# Patient Record
Sex: FEMALE | Race: WHITE | NOT HISPANIC OR LATINO | Employment: FULL TIME | URBAN - METROPOLITAN AREA
[De-identification: names, ages, dates, MRNs, and addresses within clinical notes are randomized per-mention and may not be internally consistent; named-entity substitution may affect disease eponyms.]

---

## 2018-02-07 ENCOUNTER — OFFICE VISIT (OUTPATIENT)
Dept: URGENT CARE | Facility: CLINIC | Age: 73
End: 2018-02-07
Payer: MEDICARE

## 2018-02-07 VITALS
DIASTOLIC BLOOD PRESSURE: 72 MMHG | OXYGEN SATURATION: 96 % | TEMPERATURE: 98 F | HEIGHT: 66 IN | WEIGHT: 169 LBS | RESPIRATION RATE: 18 BRPM | BODY MASS INDEX: 27.16 KG/M2 | HEART RATE: 62 BPM | SYSTOLIC BLOOD PRESSURE: 135 MMHG

## 2018-02-07 DIAGNOSIS — S90.851A SPLINTER OF RIGHT FOOT, INITIAL ENCOUNTER: Primary | ICD-10-CM

## 2018-02-07 PROCEDURE — 3008F BODY MASS INDEX DOCD: CPT | Mod: S$GLB,,, | Performed by: EMERGENCY MEDICINE

## 2018-02-07 PROCEDURE — 1159F MED LIST DOCD IN RCRD: CPT | Mod: S$GLB,,, | Performed by: EMERGENCY MEDICINE

## 2018-02-07 PROCEDURE — 99203 OFFICE O/P NEW LOW 30 MIN: CPT | Mod: S$GLB,,, | Performed by: EMERGENCY MEDICINE

## 2018-02-07 NOTE — PROGRESS NOTES
Subjective:       Patient ID: Verna Hines is a 73 y.o. female.    Vitals:    02/07/18 1614   BP: 135/72   Pulse: 62   Resp: 18   Temp: 98 °F (36.7 °C)       Chief Complaint: Foot Pain    Pt states that she feels like something is sticking her in the bottom right lateral foot just below little toe.  She has felt this in her foot for approx a day and a half.  Denies walking around barefoot. STATES  GOT SOMETHING BLACK AND SILVER OUT AND THEN SHE USED AN [employer] BOARD TO FILE IT DOWN BUT THAT WAS NOT SUCCESSFUL. NO BLEEDING, NO PUS DRAINAGE, HOWEVER A SHARP AND MILDLY PAINFUL STEP WHEN SHE WALKS. OBVIOUS SPLINTER PRESENT UPON INSPECTION ALONG THE PLANTAR ASPECT OF THE FOOT AT THE DISTAL 5TH METATARSAL.      Foot Pain   This is a new problem. The current episode started in the past 7 days. The problem has been unchanged. Pertinent negatives include no chills, fever, rash or sore throat. The symptoms are aggravated by walking.     Review of Systems   Constitution: Negative for chills and fever.   HENT: Negative for sore throat.    Respiratory: Negative for shortness of breath.    Skin: Negative for itching and rash.   Musculoskeletal: Negative for joint pain.       Objective:      Physical Exam   Constitutional: She is oriented to person, place, and time. She appears well-developed and well-nourished.   Cardiovascular: Normal rate and regular rhythm.    Pulmonary/Chest: Effort normal and breath sounds normal.   Abdominal: Soft. Bowel sounds are normal.   Musculoskeletal: Normal range of motion. She exhibits tenderness (MILD TTP OF THE PLANTAR ASPECT OF THE FOOT BY THE DISTAL 5TH MT,1-2 MM BLACK SPLINTER NOTED UNDER CALLOUS). She exhibits no edema.   SEE SKIN  TTP AT THE SITE OF LACERATION   Neurological: She is alert and oriented to person, place, and time.   DISTAL TO WOUND NV INTACT   Skin: Skin is warm and dry.   WOUND/LACERATION/ABSCESS AS FOLLOWS:  DISTAL SOLE, 1-2 MM SILVER/BLACK SPLINTER, UNDER  CALLOUS, EASILY REMOVED WITH CORING METHOID WITH 18 GAGUE NEEDLE     Nursing note and vitals reviewed.      Assessment:       1. Splinter of right foot, initial encounter        Plan:       Verna was seen today for foot pain.    Diagnoses and all orders for this visit:    Splinter of right foot, initial encounter          Patient Instructions     Foreign Object Under the Skin (Removed)  An object has been removed from under your skin. Although care was taken to remove all of it, there is always a chance that a small piece may have been left behind.  Most skin wounds heal without problems. But there can be an increased risk of infection if anything stays under the skin. Sometimes the pieces work their way out on their own, and sometimes they can cause an infection. Very small pieces that stay under the skin usually dont cause a problem or need further treatment.  Home care  Wound care  · Keep the wound clean and dry.  · If there is a dressing or bandage, change it when it gets wet or dirty. Otherwise, leave it on for the first 24 hours, then change it once a day or as often as you were instructed.  · If stitches or staples were used, clean the wound every day:  ¨ After taking off the dressing, wash the area gently with soap and water.  ¨ Apply a thin layer of antibiotic ointment to the cut. This will keep the wound clean and make it easier to remove the stitches. If it is oozing a lot, you can put a nonstick dressing over it. Then reapply the bandage or dressing as you were instructed.  ¨ You can get it wet, just like when you clean it. This means you can shower as usual for the first 24 hours, but do not soak the area in water (no baths or swimming) until the stitches or staples are taken out.  · If surgical tape or strips were used, keep the area clean and dry. If it becomes wet, blot it dry with a towel.    Medicine  · You can take over-the-counter medicine for pain, unless you were given a different pain  medicine to use. If you have chronic liver or kidney disease or ever had a stomach ulcer, or gastrointestinal bleeding or are taking blood thinner medicines, talk with your healthcare provider before using these medicines.  · If you were given antibiotics, take them until they are used up. It is important to finish the antibiotics even if the wound looks better to make sure the infection clears.  Follow-up care  Follow up your healthcare provider, or as advised. Keep in mind the following:  · Watch for any signs of infection, such as increasing pain, redness, swelling, or pus drainage. If this happens, dont wait for your scheduled visit, rather see your healthcare provider sooner.  · Stitches or staples are usually taken out within 5 to 14 days. This varies depending on what part of your body they are on, and the type of wound. The healthcare provider will tell you how long they should be left in.  · If surgical tape or strips were used, they are usually left on for 7 to 10 days. You can remove them after that unless you were told otherwise. If you try to remove them, and it is too difficult, soaking can help. If the edges of the cut pull apart, then stop removing the tape, and follow up with your healthcare provider.  · If X-rays were taken, you will be told if there are new findings that may affect your care.  When to seek medical care  Call your healthcare provider right away if any of these occur:  · Fever of 100.4ºF (38ºC) or higher, or as directed by your healthcare provider  · Increasing pain in the wound  · Redness, swelling or pus coming from the wound  Date Last Reviewed: 10/1/2016  © 8783-7110 [employer]. [address] [place] [address], [place] [address]. All rights reserved. This information is not intended as a substitute for professional medical care. Always follow your healthcare professional's instructions.        Splinter Removal  A splinter has been removed from under your skin. Although  care was taken to remove all pieces present, there is a chance that a small piece may have been left behind.  Very small particles that remain under the skin usually cause no problem and need no further treatment unless an infection develops.  You may receive a tetanus shot if needed. You may be given antibiotics to prevent or treat infection based on many factors. Some of these factors include location, severity of injury, time since injury, and other concerns.  Home care  The following guidelines will help you care for your wound at home:  · Keep the injured part elevated during the first 2 days to reduce swelling and pain.  · Keep the wound clean and dry. If a bandage was applied and it becomes wet or dirty, replace it. Otherwise, leave it in place for the first 24 hours. Then, clean the wound daily:  ¨ After removing the bandage, wash the area with soap and water. Use a wet cotton swab to loosen and remove any blood or crust that forms.  ¨ After cleaning, apply a thin layer of antibiotic ointment. Put on a fresh bandage.  · Unless told otherwise, you may shower as usual, but do not soak the area in water for at least 1 week. This means no baths or swimming.  · You may use over-the-counter medication for pain, unless another pain medicine was given. Talk with your doctor before using acetaminophen or ibuprofen if you have chronic liver or kidney disease. Also talk with your doctor if you have ever had a stomach ulcer or GI bleeding.  Follow-up care  Follow up with your healthcare provider, or as advised. Most skin wounds heal within 10 days. But there is a risk of infection if there are any particles that are still under the skin. Therefore, check the wound in 2 days for the signs of infection listed below. Any stitches should be removed within 7 to 14 days. If surgical tape closures were used, remove them yourself if they have not fallen off after 7 days.  When to seek medical advice  Call your healthcare  provider right away if any of these occur:  · Increasing pain in the wound  · Redness, swelling, or pus coming from the wound  · Fever of 100.4ºF (38ºC) or higher, or as directed by your healthcare provider  Date Last Reviewed: 10/1/2016  © 6388-5547 [employer]. [address] [place] [address], [place] [address]. All rights reserved. This information is not intended as a substitute for professional medical care. Always follow your healthcare professional's instructions.      bactroban ointment 3 timer per day  Ok to clean as normal  Return for any concerns or problems  Splinter removed

## 2018-03-05 ENCOUNTER — OFFICE VISIT (OUTPATIENT)
Dept: URGENT CARE | Facility: CLINIC | Age: 73
End: 2018-03-05
Payer: MEDICARE

## 2018-03-05 VITALS
WEIGHT: 169 LBS | SYSTOLIC BLOOD PRESSURE: 147 MMHG | RESPIRATION RATE: 16 BRPM | BODY MASS INDEX: 27.16 KG/M2 | OXYGEN SATURATION: 95 % | HEIGHT: 66 IN | DIASTOLIC BLOOD PRESSURE: 67 MMHG | HEART RATE: 54 BPM | TEMPERATURE: 98 F

## 2018-03-05 DIAGNOSIS — M67.471 GANGLION CYST OF RIGHT FOOT: ICD-10-CM

## 2018-03-05 DIAGNOSIS — B07.0 PLANTAR WART, RIGHT FOOT: Primary | ICD-10-CM

## 2018-03-05 PROCEDURE — 10060 I&D ABSCESS SIMPLE/SINGLE: CPT | Mod: S$GLB,,, | Performed by: EMERGENCY MEDICINE

## 2018-03-05 PROCEDURE — 3078F DIAST BP <80 MM HG: CPT | Mod: S$GLB,,, | Performed by: EMERGENCY MEDICINE

## 2018-03-05 PROCEDURE — 99214 OFFICE O/P EST MOD 30 MIN: CPT | Mod: 25,S$GLB,, | Performed by: EMERGENCY MEDICINE

## 2018-03-05 PROCEDURE — 3077F SYST BP >= 140 MM HG: CPT | Mod: S$GLB,,, | Performed by: EMERGENCY MEDICINE

## 2018-03-05 RX ORDER — MUPIROCIN 20 MG/G
OINTMENT TOPICAL
Qty: 22 G | Refills: 1 | Status: SHIPPED | OUTPATIENT
Start: 2018-03-05 | End: 2021-10-12

## 2018-03-05 RX ORDER — SULFAMETHOXAZOLE AND TRIMETHOPRIM 800; 160 MG/1; MG/1
1 TABLET ORAL 2 TIMES DAILY
Qty: 20 TABLET | Refills: 0 | Status: SHIPPED | OUTPATIENT
Start: 2018-03-05 | End: 2018-03-15

## 2018-03-06 NOTE — PATIENT INSTRUCTIONS
Wash with antibacterial soap and water.  Expect mild/moderate pain and mild bleeding with dressing changes.  Stay ahead of pain with tylenol and motrin.  Bactroban ointment at least 2 times daily.  Bactrim DS Rx  Follow up with Podiatry    Return for any concerns or problems  Keep clean and covered.  Ganglion Cyst: Foot  A ganglion is a fluid-filled swelling of the lining of a joint or tendon. Ganglions can form on any part of the foot. But they most often appear on the ankle or top of the foot. Ganglions tend to change in size and often grow slowly.  Causes  Repeated irritation can weaken the lining of a joint or tendon. This can lead to ganglions. People who wear boots are more likely to get ganglions. This type of footwear puts stress on the foot and ankle. Bone spurs (bony outgrowths) may also cause ganglions by irritating the joints and tendons.  Symptoms  Ganglions often form with no symptoms. But the ganglion may put pressure on the nerves and the overlying skin. This can cause tingling, numbness, or pain. Ganglions sometimes swell. Their size can change with different activities or a change in weather.  How are they diagnosed?  Ganglions are sometimes mistaken for tumors. So its important to have a complete exam done. Tests may be done to confirm the diagnosis.  Medical history  Your healthcare provider will ask you questions. These include how long youve had the ganglion and what kind of symptoms youre feeling. He or she may ask if its changed in size or if its size varies based on your activities.  Physical exam  Your healthcare provider may do a translumination exam. This involves shining a light through the swelling. (Usually, you can see through a ganglion, but not through a tumor.) When your foot is palpated (pressed), a ganglion feels spongy and the fluid moves from side to side.  Tests  If a bone spur is suspected, X-rays may be needed. Fluid removal (needle aspiration) may be done. It also helps  to decrease pain. To confirm a ganglion, magnetic resonance imaging (MRI) may be done. This reveals images of soft tissue and bone. Sometimes, special dyes may be injected into the area to show the outline.  How are ganglions treated?  Ganglions may be hard to treat without surgery. But nonsurgical methods may be helpful in relieving some of your symptoms.  Nonsurgical care  · Pads placed around the ganglion can ease pressure and friction.  · Fluid removal may also relieve symptoms. This is done with a needle. A steroid may be injected at the same time. But ganglions may recur.  · Limiting movements or activities that increase pain may bring relief.  · Icing the ganglion for 15 to 20 minutes may relieve inflammation and pain for a short time.  · If inflammation is severe, your healthcare provider may treat your symptoms with medicine.  Surgical treatment  Surgery may be needed if a ganglion is causing ongoing or severe pain. The entire ganglion wall is removed during the procedure. Some nearby tissue may also be removed.  After surgery  You may feel pain, swelling, numbness, or tingling for several weeks following surgery. You should be able to walk soon afterward. But your foot may need to be wrapped or in a cast, boot, or hard shoe. Be sure to see your healthcare provider if you notice any future problems. Surgery is usually successful. But there is a chance that the ganglion will recur.  Date Last Reviewed: 11/14/2015 © 2000-2017 [employer]. [address] [place] [address]. All rights reserved. This information is not intended as a substitute for professional medical care. Always follow your healthcare professional's instructions.        Understanding Plantar Warts    A plantar wart is a small noncancerous growth on the bottom of the foot. Plantar warts often develop where friction or pressure occurs, such as on the ball of the foot. The word plantar refers to the sole of the foot.  Similar warts can occur on other areas of the body such as the hands. Plantar warts are more common in children and young adults.  What causes a plantar wart?  Plantar warts are caused by a virus called human papillomavirus (HPV).  They can be spread by person-to-person contact. Or you can develop one if you walk barefoot on moist surfaces infected with the virus, such as in a community pool area or locker rooms. Wearing proper footwear in such places can prevent them.  What are the symptoms of plantar warts?  Plantar warts cause a thick patch of skin on the bottom of the foot. The wart may have black dots on it. These dots are dried blood. The wart may cause pain or discomfort. You may also have trouble walking because of the pain.  How are plantar warts treated?  Many plantar warts go away without any treatment. But for those that are painful or that dont go away, several treatments are available. These include:  · Salicylic acid. This treatment is applied directly to the wart. It may come in the form of a liquid, ointment, pad, or patch. It is available over the counter.  · Cryotherapy.  Your healthcare provider puts liquid nitrogen on the wart with a cotton swab. This treatment can be painful.  · Duct tape. One study shows a benefit by putting duct tape on the wart for 6 days. You then soak the wart and scrape it with an emery board. This is repeated until the wart is gone or for 2 months. Other studies show this does not work well to remove the wart.  · Medicine. A variety of medicines can be put on or injected into the wart. But research is mixed on how well they work.  Often your healthcare provider will cut away dead parts of the wart before also using one of these other treatments.    When should I call my healthcare provider?  Call your healthcare provider if you have plantar warts that become too painful and do not go away on their own or with over-the-counter and at home treatments.   Date Last  Reviewed: 3/30/2016  © 3422-5515 [employer]. [address] [place] [address], [place] [address]. All rights reserved. This information is not intended as a substitute for professional medical care. Always follow your healthcare professional's instructions.

## 2018-03-06 NOTE — PROGRESS NOTES
"Subjective:       Patient ID: Verna Hines is a 73 y.o. female.    Vitals:    03/05/18 1756   BP: (!) 147/67   Pulse: (!) 54   Resp: 16   Temp: 97.6 °F (36.4 °C)       Chief Complaint: Foot Pain    Pt was here on 2/7/18 for splinter removal from right foot. Pt states it feels like there is "still something in there". Pt states pain at puncture site.  HERE FOR RETURN OF PAIN AS WELL AS FIRM AND HARD AREA AT THE PRESSURE SITE OF THE5TH MT HEAD. SHE REPORTS DISCOLORATION AND FEELS IT IS FOREIGN BODY AND REQUESTING TO RETRIEVE IT AS IT IS CAUSING SEVERE PAIN. EXPLAINED THAT I WOULD CORE OUT AREA TO RULE OUT PUS AS WELL AS FOREIGN BODY, ALTHOUGH POSSIBLY DDX INCLUDES GANAGLION CYST UNDERNEATH, PLANTAR WART, FOREIGN BODY, CALLOUS FORMATION WITH CELLULITIS.      Foot Pain   This is a chronic problem. The current episode started 1 to 4 weeks ago. The problem occurs constantly. The problem has been gradually worsening. Pertinent negatives include no abdominal pain, chest pain, chills, fever, headaches, nausea, rash, sore throat or vomiting. The symptoms are aggravated by standing. She has tried nothing for the symptoms.     Review of Systems   Constitution: Negative for chills and fever.   HENT: Negative for sore throat.    Eyes: Negative for blurred vision.   Cardiovascular: Negative for chest pain.   Respiratory: Negative for shortness of breath.    Skin: Negative for rash.   Musculoskeletal: Negative for back pain and joint pain.   Gastrointestinal: Negative for abdominal pain, diarrhea, nausea and vomiting.   Neurological: Negative for headaches.   Psychiatric/Behavioral: The patient is not nervous/anxious.        Objective:      Physical Exam   Constitutional: She is oriented to person, place, and time. She appears well-developed and well-nourished.   HENT:   Head: Normocephalic and atraumatic. Head is without abrasion, without contusion and without laceration.   Eyes: Conjunctivae, EOM and lids are normal. " "Pupils are equal, round, and reactive to light.   Neck: Trachea normal, full passive range of motion without pain and phonation normal.   Cardiovascular: Normal rate, regular rhythm and normal heart sounds.    Pulmonary/Chest: Effort normal and breath sounds normal. No respiratory distress.   Musculoskeletal: Normal range of motion. She exhibits edema and tenderness.   MILD ERYTHEMA AND TTP OF THE RIGHT DISTAL MTP JOINT AREA, VOLAR ASPECT WITH 2 X 2 CM AREA OF CALLOUS AND FIRM AREA WHICH IS VERY TTP. DOES HAVE 1 MM AREA OF DISCOLORATION, POSSIBLY RETAINED SCAB, RETAINED FOREIGN BODY, DISCOLORATION PLANTAR WART. MILD ERYTHEMA, CELLULITIS SURROUNDING. POSSIBLE MOBILE CYST UNDERNEATH.. PLAN DISCUSSED TO CORE OUT AND CLEAN DEBRIDE AREA AS REQUESTED.   Neurological: She is alert and oriented to person, place, and time.   Skin: Skin is warm, dry and intact. Capillary refill takes less than 2 seconds. No abrasion, no bruising, no burn, no ecchymosis, no laceration, no lesion and no rash noted. There is erythema.   Psychiatric: She has a normal mood and affect. Her speech is normal and behavior is normal. Cognition and memory are normal.   Nursing note and vitals reviewed.        Incision & Drainage  Date/Time: 3/5/2018 7:32 PM  Performed by: FRANCISCO KIM  Authorized by: FRANCISCO KIM     Time out: Immediately prior to procedure a "time out" was called to verify the correct patient, procedure, equipment, support staff and site/side marked as required.    Consent Done?:  Yes (Verbal)    Type:  Abscess  Body area:  Lower extremity  Location details:  Right foot  Anesthesia:  Local infiltration  Local anesthetic:  Lidocaine 2% without epinephrinelidocaine 2% without epinephrine  Anesthetic total (ml):  5  Scalpel size:  11  Incision type:  Single straight  Complexity:  Simple  Drainage:  Pus  Drainage amount:  Scant  Wound treatment:  Incision and drainage  Patient tolerance:  Patient tolerated the procedure well " with no immediate complications    WOUND ANESTHETISED WITH LIDOCAINE   GOOD ANESTHESIAS ACHIEVED AND CORED OUT AREA WITH DISCOLORATION AND DOES NOT APPERAR TO BE FOREIGN BODY. APPEARS TO BE CALLOUS SKIN WITH LIKELY PLANTART WART. COINCIDENTALLY A TINY AMOUNT OF PUS EXPRESSED AND THEN PINK/GELATINOUS FLUID EXPRESSED FOLLOWED BY A WHITE FLACCID SAC THAT WAS REMOVED. BY INSPECTION AND LOCATION THIS WAS A GANGLION CYST AT THE SAME AREA ALONG THE FLEXORS OF THE FOOT SPECIFICALLY THE 5TH TOE. AREA CLEANED AND COVERED WITH BACTROBAN OINTMENT AND GAUZE AND THE ACE WRAPPED. PLACED ON ORAL ABX AND BACTROBAN OINTMENT. REFERRED TO PODIATRY AND ENCOURAGED TO RETURN FOR ANY PROBLEMS. DECLINED PAIN MEDS,.        Assessment:       1. Plantar wart, right foot    2. Ganglion cyst of right foot        Plan:       Verna was seen today for foot pain.    Diagnoses and all orders for this visit:    Plantar wart, right foot  -     Ambulatory referral to Podiatry    Ganglion cyst of right foot  -     Ambulatory referral to Podiatry    Other orders  -     mupirocin (BACTROBAN) 2 % ointment; Apply to affected area 3 times daily  -     sulfamethoxazole-trimethoprim 800-160mg (BACTRIM DS) 800-160 mg Tab; Take 1 tablet by mouth 2 (two) times daily.  -     INCISION AND DRAINAGE          Patient Instructions     Wash with antibacterial soap and water.  Expect mild/moderate pain and mild bleeding with dressing changes.  Stay ahead of pain with tylenol and motrin.  Bactroban ointment at least 2 times daily.  Bactrim DS Rx  Follow up with Podiatry    Return for any concerns or problems  Keep clean and covered.  Ganglion Cyst: Foot  A ganglion is a fluid-filled swelling of the lining of a joint or tendon. Ganglions can form on any part of the foot. But they most often appear on the ankle or top of the foot. Ganglions tend to change in size and often grow slowly.  Causes  Repeated irritation can weaken the lining of a joint or tendon. This can lead to  ganglions. People who wear boots are more likely to get ganglions. This type of footwear puts stress on the foot and ankle. Bone spurs (bony outgrowths) may also cause ganglions by irritating the joints and tendons.  Symptoms  Ganglions often form with no symptoms. But the ganglion may put pressure on the nerves and the overlying skin. This can cause tingling, numbness, or pain. Ganglions sometimes swell. Their size can change with different activities or a change in weather.  How are they diagnosed?  Ganglions are sometimes mistaken for tumors. So its important to have a complete exam done. Tests may be done to confirm the diagnosis.  Medical history  Your healthcare provider will ask you questions. These include how long youve had the ganglion and what kind of symptoms youre feeling. He or she may ask if its changed in size or if its size varies based on your activities.  Physical exam  Your healthcare provider may do a translumination exam. This involves shining a light through the swelling. (Usually, you can see through a ganglion, but not through a tumor.) When your foot is palpated (pressed), a ganglion feels spongy and the fluid moves from side to side.  Tests  If a bone spur is suspected, X-rays may be needed. Fluid removal (needle aspiration) may be done. It also helps to decrease pain. To confirm a ganglion, magnetic resonance imaging (MRI) may be done. This reveals images of soft tissue and bone. Sometimes, special dyes may be injected into the area to show the outline.  How are ganglions treated?  Ganglions may be hard to treat without surgery. But nonsurgical methods may be helpful in relieving some of your symptoms.  Nonsurgical care  · Pads placed around the ganglion can ease pressure and friction.  · Fluid removal may also relieve symptoms. This is done with a needle. A steroid may be injected at the same time. But ganglions may recur.  · Limiting movements or activities that increase pain may  bring relief.  · Icing the ganglion for 15 to 20 minutes may relieve inflammation and pain for a short time.  · If inflammation is severe, your healthcare provider may treat your symptoms with medicine.  Surgical treatment  Surgery may be needed if a ganglion is causing ongoing or severe pain. The entire ganglion wall is removed during the procedure. Some nearby tissue may also be removed.  After surgery  You may feel pain, swelling, numbness, or tingling for several weeks following surgery. You should be able to walk soon afterward. But your foot may need to be wrapped or in a cast, boot, or hard shoe. Be sure to see your healthcare provider if you notice any future problems. Surgery is usually successful. But there is a chance that the ganglion will recur.  Date Last Reviewed: 11/14/2015 © 2000-2017 [employer]. [address] [place] [address], [place] [address]. All rights reserved. This information is not intended as a substitute for professional medical care. Always follow your healthcare professional's instructions.        Understanding Plantar Warts    A plantar wart is a small noncancerous growth on the bottom of the foot. Plantar warts often develop where friction or pressure occurs, such as on the ball of the foot. The word plantar refers to the sole of the foot. Similar warts can occur on other areas of the body such as the hands. Plantar warts are more common in children and young adults.  What causes a plantar wart?  Plantar warts are caused by a virus called human papillomavirus (HPV).  They can be spread by person-to-person contact. Or you can develop one if you walk barefoot on moist surfaces infected with the virus, such as in a community pool area or locker rooms. Wearing proper footwear in such places can prevent them.  What are the symptoms of plantar warts?  Plantar warts cause a thick patch of skin on the bottom of the foot. The wart may have black dots on it. These dots are dried blood.  The wart may cause pain or discomfort. You may also have trouble walking because of the pain.  How are plantar warts treated?  Many plantar warts go away without any treatment. But for those that are painful or that dont go away, several treatments are available. These include:  · Salicylic acid. This treatment is applied directly to the wart. It may come in the form of a liquid, ointment, pad, or patch. It is available over the counter.  · Cryotherapy.  Your healthcare provider puts liquid nitrogen on the wart with a cotton swab. This treatment can be painful.  · Duct tape. One study shows a benefit by putting duct tape on the wart for 6 days. You then soak the wart and scrape it with an emery board. This is repeated until the wart is gone or for 2 months. Other studies show this does not work well to remove the wart.  · Medicine. A variety of medicines can be put on or injected into the wart. But research is mixed on how well they work.  Often your healthcare provider will cut away dead parts of the wart before also using one of these other treatments.    When should I call my healthcare provider?  Call your healthcare provider if you have plantar warts that become too painful and do not go away on their own or with over-the-counter and at home treatments.   Date Last Reviewed: 3/30/2016  © 4579-8018 [employer]. [address] [place] [address]. All rights reserved. This information is not intended as a substitute for professional medical care. Always follow your healthcare professional's instructions.        03/07/2018  Mrs Hines called as she could not tolerate her Bactrim DS rx.  In speaking with her she said the foot is doing better with only scant discharge that was 'more red'.  She has had no fever either.  In light of this information, I recommended that we suspend oral antibiotics as Dr. Stuart's procedure most likely evacuated the source of the infection as well as the infection.   She is in agreement and will observe for now.

## 2018-09-21 ENCOUNTER — OFFICE VISIT (OUTPATIENT)
Dept: ENDOCRINOLOGY | Facility: CLINIC | Age: 73
End: 2018-09-21
Payer: MEDICARE

## 2018-09-21 VITALS
DIASTOLIC BLOOD PRESSURE: 74 MMHG | SYSTOLIC BLOOD PRESSURE: 122 MMHG | WEIGHT: 173.75 LBS | BODY MASS INDEX: 27.92 KG/M2 | RESPIRATION RATE: 16 BRPM | HEIGHT: 66 IN

## 2018-09-21 DIAGNOSIS — E55.9 VITAMIN D DEFICIENCY: ICD-10-CM

## 2018-09-21 DIAGNOSIS — M85.80 OSTEOPENIA, UNSPECIFIED LOCATION: ICD-10-CM

## 2018-09-21 DIAGNOSIS — E06.3 HASHIMOTO'S THYROIDITIS: Primary | ICD-10-CM

## 2018-09-21 DIAGNOSIS — L50.8 URTICARIA, CHRONIC: Chronic | ICD-10-CM

## 2018-09-21 PROCEDURE — 3074F SYST BP LT 130 MM HG: CPT | Mod: CPTII,,, | Performed by: INTERNAL MEDICINE

## 2018-09-21 PROCEDURE — 99204 OFFICE O/P NEW MOD 45 MIN: CPT | Mod: S$PBB,,, | Performed by: INTERNAL MEDICINE

## 2018-09-21 PROCEDURE — 99213 OFFICE O/P EST LOW 20 MIN: CPT | Mod: PBBFAC | Performed by: INTERNAL MEDICINE

## 2018-09-21 PROCEDURE — 3078F DIAST BP <80 MM HG: CPT | Mod: CPTII,,, | Performed by: INTERNAL MEDICINE

## 2018-09-21 PROCEDURE — 99999 PR PBB SHADOW E&M-EST. PATIENT-LVL III: CPT | Mod: PBBFAC,,, | Performed by: INTERNAL MEDICINE

## 2018-09-21 RX ORDER — LEVOTHYROXINE SODIUM 50 UG/1
50 TABLET ORAL DAILY
Qty: 90 TABLET | Refills: 3 | Status: SHIPPED | OUTPATIENT
Start: 2018-09-21 | End: 2019-05-21 | Stop reason: SDUPTHER

## 2018-09-21 RX ORDER — ERGOCALCIFEROL 1.25 MG/1
50000 CAPSULE ORAL
Qty: 3 CAPSULE | Refills: 3 | Status: SHIPPED | OUTPATIENT
Start: 2018-09-21 | End: 2019-08-18

## 2018-09-21 NOTE — ASSESSMENT & PLAN NOTE
Refused repeat DEXA.    Continue vitamin D and adequate dietary calcium.    Encouraged weight-bearing exercises.    Low fall risk.

## 2018-09-21 NOTE — PROGRESS NOTES
Subjective:      Chief Complaint: Establish care for hypothyroidism    HPI: Verna Hines is a 73 y.o. female who is here for an initial evaluation for Hashimoto's thyroiditis    She was previously followed by Dr. Serra, who is retiring next month, so she is here to establish care. She was seen in our clinic over 3 years ago for chronic urticaria and hypothyroidism.    With regards to Hashimoto's thyroiditis:  She is currently taking brand-name Synthroid 50 mcg daily.  Denies hypothyroid symptoms other than brittle nails.    Takes it first thing in the AM on empty stomach    She has two daughters with Hashimoto's as well. One has Down syndrome and is followed by me for her thyroid.    With regards to osteopenia:  No fragility fractures  Not on treatment.  She refuses repeat DEXA - she had back problems after having to cross her legs for the scan, so she doesn't want it done again.    She stays active, cutting grass with her .  Works as a  40 hours/week    With regards to vitamin D deficiency:  Currently on ergocalciferol 50k monthly.    Reviewed past medical, family, social history and updated as appropriate.    Review of Systems   Constitutional: Negative for unexpected weight change.   Eyes: Negative for visual disturbance.   Respiratory: Negative for shortness of breath.    Cardiovascular: Negative for chest pain.   Gastrointestinal: Negative for abdominal pain.   Genitourinary: Negative for urgency.   Musculoskeletal: Negative for arthralgias.   Skin: Negative for wound.        +brittle nails   Neurological: Negative for headaches.   Hematological: Does not bruise/bleed easily.   Psychiatric/Behavioral: Negative for sleep disturbance.     Objective:     Vitals:    09/21/18 1014   BP: 122/74   Resp: 16       Physical Exam   Constitutional: She is oriented to person, place, and time. She appears well-developed.   HENT:   Right Ear: External ear normal.   Left Ear: External ear normal.    Nose: Nose normal.   Hearing grossly normal  Dentition grossly normal   Neck: No tracheal deviation present. No thyromegaly (no palpable nodules) present.   Cardiovascular: Normal rate.   No murmur heard.  Pulmonary/Chest: Effort normal and breath sounds normal.   Abdominal: Soft. She exhibits no mass. There is no tenderness.   Musculoskeletal: She exhibits no edema.   No digital clubbing or extremity cyanosis   Neurological: She is alert and oriented to person, place, and time. Coordination normal.   Skin: Skin is warm and dry. No rash noted.   No subcutaneous nodules noted.  +Bruise on right arm - she hit it on something recently  Nails appear normal on inspection   Psychiatric: She has a normal mood and affect. Her behavior is normal.   Alert and oriented to person, place, and situation.   Nursing note and vitals reviewed.      Wt Readings from Last 10 Encounters:   09/21/18 1014 78.8 kg (173 lb 11.6 oz)   03/05/18 1756 76.7 kg (169 lb)   02/07/18 1614 76.7 kg (169 lb)   02/23/15 0541 65.3 kg (144 lb)   02/20/15 0900 68 kg (150 lb 0 oz)   02/20/15 0738 68 kg (150 lb 0 oz)   03/26/14 0900 65.8 kg (145 lb)   03/24/14 0808 65.8 kg (145 lb)   04/02/13 1518 68.7 kg (151 lb 6.4 oz)   03/14/13 0823 68.9 kg (152 lb)       No results found for: HGBA1C  No results found for: CHOL, HDL, LDLCALC, TRIG, CHOLHDL  Lab Results   Component Value Date    TSH 1.742 03/14/2013      No results found for: MICALBCREAT    Assessment/Plan:     Hashimoto's thyroiditis  Ordered refill of synthroid 50 mcg.    TSH to be done at [facility] in a few weeks - advised to hold biotin for a week prior to having the labs drawn.    Check TPO    Urticaria, chronic  Denies ongoing symptoms.    Vitamin D deficiency  Ordered refill for maitenance ergo 50k monthly  Check Vit D.    Osteopenia  Refused repeat DEXA.    Continue vitamin D and adequate dietary calcium.    Encouraged weight-bearing exercises.    Low fall risk.      RTC in 1 year    I discussed the  case with Dr. Baxter and he is in agreement with the plan.

## 2018-09-21 NOTE — ASSESSMENT & PLAN NOTE
Ordered refill of synthroid 50 mcg.    TSH to be done at [facility] in a few weeks - advised to hold biotin for a week prior to having the labs drawn.    Check TPO

## 2018-10-05 LAB
1,25(OH)2D SERPL-MCNC: 26 PG/ML (ref 18–72)
1,25(OH)2D2 SERPL-MCNC: <8 PG/ML
1,25(OH)2D3 SERPL-MCNC: 26 PG/ML
T4 FREE SERPL-MCNC: 1.1 NG/DL (ref 0.8–1.8)
THYROPEROXIDASE AB SERPL-ACNC: 454 IU/ML
TSH SERPL-ACNC: 3.67 MIU/L (ref 0.4–4.5)

## 2018-10-08 ENCOUNTER — TELEPHONE (OUTPATIENT)
Dept: ENDOCRINOLOGY | Facility: CLINIC | Age: 73
End: 2018-10-08

## 2018-10-08 NOTE — TELEPHONE ENCOUNTER
Spoke with patient regarding her results. TPO positive as expected. Continue same dose of brand-name synthroid as before.

## 2019-05-21 DIAGNOSIS — E06.3 HASHIMOTO'S THYROIDITIS: ICD-10-CM

## 2019-05-21 RX ORDER — LEVOTHYROXINE SODIUM 50 UG/1
50 TABLET ORAL DAILY
Qty: 90 TABLET | Refills: 3 | Status: SHIPPED | OUTPATIENT
Start: 2019-05-21 | End: 2020-07-06 | Stop reason: SDUPTHER

## 2019-05-21 NOTE — TELEPHONE ENCOUNTER
----- Message from Marnie Gutiérrez sent at 5/21/2019  8:39 AM CDT -----  Contact:    Patient   [phone]  Rx Refill/Request     Is this a Refill or New Rx:  Refill    Rx Name and Strength:     SYNTHROID 50 mcg tablet  Preferred Pharmacy with phone number:   [facility] ([place]   [phone]  Communication Preference: Phone   Additional Information: Pt needs an refill on medication or enough medication to last to next appt with the ....    Pt is completely out

## 2020-07-06 DIAGNOSIS — E06.3 HASHIMOTO'S THYROIDITIS: ICD-10-CM

## 2020-07-06 RX ORDER — LEVOTHYROXINE SODIUM 50 UG/1
50 TABLET ORAL DAILY
Qty: 90 TABLET | Refills: 3 | Status: SHIPPED | OUTPATIENT
Start: 2020-07-06 | End: 2021-06-18

## 2020-07-06 NOTE — TELEPHONE ENCOUNTER
Spoke with patient, informed her she may not be able to get refills because she has not had a visit in almost 2 yrs. {atient scheduled visit but informed me that she may not be able to keep appointment scheduled.

## 2020-07-06 NOTE — TELEPHONE ENCOUNTER
Patient requested  Patient STEPHANY 9/2018 Appointment scheduled July 8, @ 3:30pm, Patient states she may not be able to keep appointment  Refills sent

## 2020-07-08 ENCOUNTER — OFFICE VISIT (OUTPATIENT)
Dept: ENDOCRINOLOGY | Facility: CLINIC | Age: 75
End: 2020-07-08
Payer: MEDICARE

## 2020-07-08 VITALS
WEIGHT: 160.63 LBS | HEART RATE: 58 BPM | RESPIRATION RATE: 18 BRPM | OXYGEN SATURATION: 99 % | HEIGHT: 66 IN | DIASTOLIC BLOOD PRESSURE: 72 MMHG | SYSTOLIC BLOOD PRESSURE: 118 MMHG | TEMPERATURE: 99 F | BODY MASS INDEX: 25.81 KG/M2

## 2020-07-08 DIAGNOSIS — M85.80 OSTEOPENIA, UNSPECIFIED LOCATION: ICD-10-CM

## 2020-07-08 DIAGNOSIS — E06.3 HASHIMOTO'S THYROIDITIS: Primary | ICD-10-CM

## 2020-07-08 PROCEDURE — 1159F MED LIST DOCD IN RCRD: CPT | Mod: S$GLB,,, | Performed by: INTERNAL MEDICINE

## 2020-07-08 PROCEDURE — 99213 PR OFFICE/OUTPT VISIT, EST, LEVL III, 20-29 MIN: ICD-10-PCS | Mod: S$GLB,,, | Performed by: INTERNAL MEDICINE

## 2020-07-08 PROCEDURE — 99999 PR PBB SHADOW E&M-EST. PATIENT-LVL IV: ICD-10-PCS | Mod: PBBFAC,,, | Performed by: INTERNAL MEDICINE

## 2020-07-08 PROCEDURE — 1101F PT FALLS ASSESS-DOCD LE1/YR: CPT | Mod: CPTII,S$GLB,, | Performed by: INTERNAL MEDICINE

## 2020-07-08 PROCEDURE — 99999 PR PBB SHADOW E&M-EST. PATIENT-LVL IV: CPT | Mod: PBBFAC,,, | Performed by: INTERNAL MEDICINE

## 2020-07-08 PROCEDURE — 1126F PR PAIN SEVERITY QUANTIFIED, NO PAIN PRESENT: ICD-10-PCS | Mod: S$GLB,,, | Performed by: INTERNAL MEDICINE

## 2020-07-08 PROCEDURE — 1101F PR PT FALLS ASSESS DOC 0-1 FALLS W/OUT INJ PAST YR: ICD-10-PCS | Mod: CPTII,S$GLB,, | Performed by: INTERNAL MEDICINE

## 2020-07-08 PROCEDURE — 3074F PR MOST RECENT SYSTOLIC BLOOD PRESSURE < 130 MM HG: ICD-10-PCS | Mod: CPTII,S$GLB,, | Performed by: INTERNAL MEDICINE

## 2020-07-08 PROCEDURE — 1126F AMNT PAIN NOTED NONE PRSNT: CPT | Mod: S$GLB,,, | Performed by: INTERNAL MEDICINE

## 2020-07-08 PROCEDURE — 1159F PR MEDICATION LIST DOCUMENTED IN MEDICAL RECORD: ICD-10-PCS | Mod: S$GLB,,, | Performed by: INTERNAL MEDICINE

## 2020-07-08 PROCEDURE — 99213 OFFICE O/P EST LOW 20 MIN: CPT | Mod: S$GLB,,, | Performed by: INTERNAL MEDICINE

## 2020-07-08 PROCEDURE — 3078F DIAST BP <80 MM HG: CPT | Mod: CPTII,S$GLB,, | Performed by: INTERNAL MEDICINE

## 2020-07-08 PROCEDURE — 3078F PR MOST RECENT DIASTOLIC BLOOD PRESSURE < 80 MM HG: ICD-10-PCS | Mod: CPTII,S$GLB,, | Performed by: INTERNAL MEDICINE

## 2020-07-08 PROCEDURE — 3074F SYST BP LT 130 MM HG: CPT | Mod: CPTII,S$GLB,, | Performed by: INTERNAL MEDICINE

## 2020-07-08 NOTE — PROGRESS NOTES
Subjective:      Chief Complaint: Establish care for hypothyroidism    HPI: Verna Hines is a 75 y.o. female who is here for an initial evaluation for Hashimoto's thyroiditis    She was previously followed by Dr. Serra, who is retiring next month, so she is here to establish care. She was seen in our clinic over 3 years ago for chronic urticaria and hypothyroidism.    With regards to Hashimoto's thyroiditis:  She is currently taking brand-name Synthroid 50 mcg daily.  Denies hypothyroid symptoms other than brittle nails.    Takes it first thing in the AM on empty stomach    She has two daughters with Hashimoto's as well. One has Down syndrome and is followed by me for her thyroid.    With regards to osteopenia:  No fragility fractures  Not on treatment.  She refuses repeat DEXA - she had back problems after having to cross her legs for the scan, so she doesn't want it done again.    She stays active, cutting grass with her .  Works as a  40 hours/week    Reviewed labs through Care Everywhere:        With regards to vitamin D deficiency:  Currently on ergocalciferol 50k monthly.    Reviewed past medical, family, social history and updated as appropriate.    Review of Systems   Constitutional: Negative for unexpected weight change.   Eyes: Negative for visual disturbance.   Respiratory: Negative for shortness of breath.    Cardiovascular: Negative for chest pain.   Gastrointestinal: Negative for abdominal pain.   Genitourinary: Negative for urgency.   Musculoskeletal: Negative for arthralgias.   Skin: Negative for wound.        +brittle nails   Neurological: Negative for headaches.   Hematological: Does not bruise/bleed easily.   Psychiatric/Behavioral: Negative for sleep disturbance.     Objective:     Vitals:    07/08/20 1517   BP: 118/72   Pulse: (!) 58   Resp: 18   Temp: 99.1 °F (37.3 °C)       Physical Exam  Vitals signs and nursing note reviewed.   Constitutional:       Appearance: She  is well-developed.   HENT:      Right Ear: External ear normal.      Left Ear: External ear normal.      Nose: Nose normal.   Neck:      Thyroid: No thyromegaly (no palpable nodules).      Trachea: No tracheal deviation.   Cardiovascular:      Rate and Rhythm: Normal rate.      Heart sounds: No murmur.   Pulmonary:      Effort: Pulmonary effort is normal.      Breath sounds: Normal breath sounds.   Abdominal:      Palpations: Abdomen is soft. There is no mass.      Tenderness: There is no abdominal tenderness.   Musculoskeletal:      Comments: No digital clubbing or extremity cyanosis   Skin:     General: Skin is warm and dry.      Findings: No rash.      Comments: No subcutaneous nodules noted.  +Bruise on right arm - she hit it on something recently  Nails appear normal on inspection   Neurological:      Mental Status: She is alert and oriented to person, place, and time.      Coordination: Coordination normal.   Psychiatric:         Behavior: Behavior normal.      Comments: Alert and oriented to person, place, and situation.         Wt Readings from Last 10 Encounters:   07/08/20 1517 72.8 kg (160 lb 9.7 oz)   09/21/18 1014 78.8 kg (173 lb 11.6 oz)   03/05/18 1756 76.7 kg (169 lb)   02/07/18 1614 76.7 kg (169 lb)   02/23/15 0541 65.3 kg (144 lb)   02/20/15 0900 68 kg (150 lb)   02/20/15 0738 68 kg (150 lb)   03/26/14 0900 65.8 kg (145 lb)   03/24/14 0808 65.8 kg (145 lb)   04/02/13 1518 68.7 kg (151 lb 6.4 oz)   03/14/13 0823 68.9 kg (152 lb)       Lab Results   Component Value Date    TSH 3.67 10/02/2018      No results found for: MICALBCREAT    Assessment/Plan:     Hashimoto's thyroiditis  Ordered refill of brand name Synthroid 50 mcg. Did not feel well on generic.    Yearly TSH - can be done through primary care.      Osteopenia  Refused repeat DEXA.    Continue vitamin D and adequate dietary calcium.    Encouraged weight-bearing exercises.    Low fall risk.      RTC in 1 year

## 2020-07-08 NOTE — ASSESSMENT & PLAN NOTE
Ordered refill of brand name Synthroid 50 mcg. Did not feel well on generic.    Yearly TSH - can be done through primary care.

## 2021-06-23 ENCOUNTER — TELEPHONE (OUTPATIENT)
Dept: ENDOCRINOLOGY | Facility: CLINIC | Age: 76
End: 2021-06-23

## 2021-06-23 DIAGNOSIS — M85.80 OSTEOPENIA, UNSPECIFIED LOCATION: ICD-10-CM

## 2021-06-23 DIAGNOSIS — E06.3 HASHIMOTO'S THYROIDITIS: Primary | ICD-10-CM

## 2021-06-25 ENCOUNTER — TELEPHONE (OUTPATIENT)
Dept: ENDOCRINOLOGY | Facility: CLINIC | Age: 76
End: 2021-06-25

## 2021-06-30 ENCOUNTER — TELEPHONE (OUTPATIENT)
Dept: ENDOCRINOLOGY | Facility: CLINIC | Age: 76
End: 2021-06-30

## 2021-06-30 LAB
25(OH)D3 SERPL-MCNC: 49 NG/ML (ref 30–100)
T4 FREE SERPL-MCNC: 1.1 NG/DL (ref 0.8–1.8)
TSH SERPL-ACNC: 4.1 MIU/L (ref 0.4–4.5)

## 2021-10-12 ENCOUNTER — OFFICE VISIT (OUTPATIENT)
Dept: ENDOCRINOLOGY | Facility: CLINIC | Age: 76
End: 2021-10-12
Payer: MEDICARE

## 2021-10-12 VITALS
DIASTOLIC BLOOD PRESSURE: 72 MMHG | HEART RATE: 59 BPM | SYSTOLIC BLOOD PRESSURE: 110 MMHG | WEIGHT: 157.94 LBS | BODY MASS INDEX: 25.38 KG/M2 | HEIGHT: 66 IN | OXYGEN SATURATION: 96 %

## 2021-10-12 DIAGNOSIS — E55.9 VITAMIN D DEFICIENCY: ICD-10-CM

## 2021-10-12 DIAGNOSIS — M85.80 OSTEOPENIA, UNSPECIFIED LOCATION: ICD-10-CM

## 2021-10-12 DIAGNOSIS — E06.3 HASHIMOTO'S THYROIDITIS: Primary | ICD-10-CM

## 2021-10-12 PROCEDURE — 99999 PR PBB SHADOW E&M-EST. PATIENT-LVL III: CPT | Mod: PBBFAC,,, | Performed by: INTERNAL MEDICINE

## 2021-10-12 PROCEDURE — 1126F AMNT PAIN NOTED NONE PRSNT: CPT | Mod: CPTII,S$GLB,, | Performed by: INTERNAL MEDICINE

## 2021-10-12 PROCEDURE — 1101F PR PT FALLS ASSESS DOC 0-1 FALLS W/OUT INJ PAST YR: ICD-10-PCS | Mod: CPTII,S$GLB,, | Performed by: INTERNAL MEDICINE

## 2021-10-12 PROCEDURE — 1159F MED LIST DOCD IN RCRD: CPT | Mod: CPTII,S$GLB,, | Performed by: INTERNAL MEDICINE

## 2021-10-12 PROCEDURE — 1157F PR ADVANCE CARE PLAN OR EQUIV PRESENT IN MEDICAL RECORD: ICD-10-PCS | Mod: CPTII,S$GLB,, | Performed by: INTERNAL MEDICINE

## 2021-10-12 PROCEDURE — 3288F FALL RISK ASSESSMENT DOCD: CPT | Mod: CPTII,S$GLB,, | Performed by: INTERNAL MEDICINE

## 2021-10-12 PROCEDURE — 99213 PR OFFICE/OUTPT VISIT, EST, LEVL III, 20-29 MIN: ICD-10-PCS | Mod: S$GLB,,, | Performed by: INTERNAL MEDICINE

## 2021-10-12 PROCEDURE — 1126F PR PAIN SEVERITY QUANTIFIED, NO PAIN PRESENT: ICD-10-PCS | Mod: CPTII,S$GLB,, | Performed by: INTERNAL MEDICINE

## 2021-10-12 PROCEDURE — 1157F ADVNC CARE PLAN IN RCRD: CPT | Mod: CPTII,S$GLB,, | Performed by: INTERNAL MEDICINE

## 2021-10-12 PROCEDURE — 3078F DIAST BP <80 MM HG: CPT | Mod: CPTII,S$GLB,, | Performed by: INTERNAL MEDICINE

## 2021-10-12 PROCEDURE — 3078F PR MOST RECENT DIASTOLIC BLOOD PRESSURE < 80 MM HG: ICD-10-PCS | Mod: CPTII,S$GLB,, | Performed by: INTERNAL MEDICINE

## 2021-10-12 PROCEDURE — 3288F PR FALLS RISK ASSESSMENT DOCUMENTED: ICD-10-PCS | Mod: CPTII,S$GLB,, | Performed by: INTERNAL MEDICINE

## 2021-10-12 PROCEDURE — 3074F SYST BP LT 130 MM HG: CPT | Mod: CPTII,S$GLB,, | Performed by: INTERNAL MEDICINE

## 2021-10-12 PROCEDURE — 99999 PR PBB SHADOW E&M-EST. PATIENT-LVL III: ICD-10-PCS | Mod: PBBFAC,,, | Performed by: INTERNAL MEDICINE

## 2021-10-12 PROCEDURE — 99213 OFFICE O/P EST LOW 20 MIN: CPT | Mod: S$GLB,,, | Performed by: INTERNAL MEDICINE

## 2021-10-12 PROCEDURE — 3074F PR MOST RECENT SYSTOLIC BLOOD PRESSURE < 130 MM HG: ICD-10-PCS | Mod: CPTII,S$GLB,, | Performed by: INTERNAL MEDICINE

## 2021-10-12 PROCEDURE — 1159F PR MEDICATION LIST DOCUMENTED IN MEDICAL RECORD: ICD-10-PCS | Mod: CPTII,S$GLB,, | Performed by: INTERNAL MEDICINE

## 2021-10-12 PROCEDURE — 1101F PT FALLS ASSESS-DOCD LE1/YR: CPT | Mod: CPTII,S$GLB,, | Performed by: INTERNAL MEDICINE

## 2021-10-12 RX ORDER — METHYLPREDNISOLONE 4 MG
1 TABLET, DOSE PACK ORAL DAILY
COMMUNITY

## 2021-10-12 RX ORDER — CHOLECALCIFEROL (VITAMIN D3) 50 MCG
2000 TABLET ORAL DAILY
COMMUNITY

## 2022-02-23 DIAGNOSIS — D84.9 IMMUNOSUPPRESSED STATUS: ICD-10-CM

## 2022-10-13 NOTE — PATIENT INSTRUCTIONS
Foreign Object Under the Skin (Removed)  An object has been removed from under your skin. Although care was taken to remove all of it, there is always a chance that a small piece may have been left behind.  Most skin wounds heal without problems. But there can be an increased risk of infection if anything stays under the skin. Sometimes the pieces work their way out on their own, and sometimes they can cause an infection. Very small pieces that stay under the skin usually dont cause a problem or need further treatment.  Home care  Wound care  · Keep the wound clean and dry.  · If there is a dressing or bandage, change it when it gets wet or dirty. Otherwise, leave it on for the first 24 hours, then change it once a day or as often as you were instructed.  · If stitches or staples were used, clean the wound every day:  ¨ After taking off the dressing, wash the area gently with soap and water.  ¨ Apply a thin layer of antibiotic ointment to the cut. This will keep the wound clean and make it easier to remove the stitches. If it is oozing a lot, you can put a nonstick dressing over it. Then reapply the bandage or dressing as you were instructed.  ¨ You can get it wet, just like when you clean it. This means you can shower as usual for the first 24 hours, but do not soak the area in water (no baths or swimming) until the stitches or staples are taken out.  · If surgical tape or strips were used, keep the area clean and dry. If it becomes wet, blot it dry with a towel.    Medicine  · You can take over-the-counter medicine for pain, unless you were given a different pain medicine to use. If you have chronic liver or kidney disease or ever had a stomach ulcer, or gastrointestinal bleeding or are taking blood thinner medicines, talk with your healthcare provider before using these medicines.  · If you were given antibiotics, take them until they are used up. It is important to finish the antibiotics even if the wound  looks better to make sure the infection clears.  Follow-up care  Follow up your healthcare provider, or as advised. Keep in mind the following:  · Watch for any signs of infection, such as increasing pain, redness, swelling, or pus drainage. If this happens, dont wait for your scheduled visit, rather see your healthcare provider sooner.  · Stitches or staples are usually taken out within 5 to 14 days. This varies depending on what part of your body they are on, and the type of wound. The healthcare provider will tell you how long they should be left in.  · If surgical tape or strips were used, they are usually left on for 7 to 10 days. You can remove them after that unless you were told otherwise. If you try to remove them, and it is too difficult, soaking can help. If the edges of the cut pull apart, then stop removing the tape, and follow up with your healthcare provider.  · If X-rays were taken, you will be told if there are new findings that may affect your care.  When to seek medical care  Call your healthcare provider right away if any of these occur:  · Fever of 100.4ºF (38ºC) or higher, or as directed by your healthcare provider  · Increasing pain in the wound  · Redness, swelling or pus coming from the wound  Date Last Reviewed: 10/1/2016  © 1780-1080 [email or web address]. [address] [place] [address], [place] [address]. All rights reserved. This information is not intended as a substitute for professional medical care. Always follow your healthcare professional's instructions.        Splinter Removal  A splinter has been removed from under your skin. Although care was taken to remove all pieces present, there is a chance that a small piece may have been left behind.  Very small particles that remain under the skin usually cause no problem and need no further treatment unless an infection develops.  You may receive a tetanus shot if needed. You may be given antibiotics to prevent or treat infection based on  many factors. Some of these factors include location, severity of injury, time since injury, and other concerns.  Home care  The following guidelines will help you care for your wound at home:  · Keep the injured part elevated during the first 2 days to reduce swelling and pain.  · Keep the wound clean and dry. If a bandage was applied and it becomes wet or dirty, replace it. Otherwise, leave it in place for the first 24 hours. Then, clean the wound daily:  ¨ After removing the bandage, wash the area with soap and water. Use a wet cotton swab to loosen and remove any blood or crust that forms.  ¨ After cleaning, apply a thin layer of antibiotic ointment. Put on a fresh bandage.  · Unless told otherwise, you may shower as usual, but do not soak the area in water for at least 1 week. This means no baths or swimming.  · You may use over-the-counter medication for pain, unless another pain medicine was given. Talk with your doctor before using acetaminophen or ibuprofen if you have chronic liver or kidney disease. Also talk with your doctor if you have ever had a stomach ulcer or GI bleeding.  Follow-up care  Follow up with your healthcare provider, or as advised. Most skin wounds heal within 10 days. But there is a risk of infection if there are any particles that are still under the skin. Therefore, check the wound in 2 days for the signs of infection listed below. Any stitches should be removed within 7 to 14 days. If surgical tape closures were used, remove them yourself if they have not fallen off after 7 days.  When to seek medical advice  Call your healthcare provider right away if any of these occur:  · Increasing pain in the wound  · Redness, swelling, or pus coming from the wound  · Fever of 100.4ºF (38ºC) or higher, or as directed by your healthcare provider  Date Last Reviewed: 10/1/2016  © 4051-6891 [employer]. [address] [place] [address], [place] [address]. All rights reserved. This information  is not intended as a substitute for professional medical care. Always follow your healthcare professional's instructions.      bactroban ointment 3 timer per day  Ok to clean as normal  Return for any concerns or problems  Splinter removed   no

## 2023-03-02 ENCOUNTER — TELEPHONE (OUTPATIENT)
Dept: ENDOCRINOLOGY | Facility: CLINIC | Age: 78
End: 2023-03-02
Payer: MEDICARE

## 2023-03-02 NOTE — TELEPHONE ENCOUNTER
----- Message from Lauren Chiang sent at 3/2/2023 12:57 PM CST -----  Type :  Needs Medical Advice    Who Called: pt  Symptoms (please be specific):annual  How long has patient had these symptoms:  annual  Pharmacy name and phone #:   no  Would the patient rather a call back or a response via [facility]?  Call   Best Call Back Number:  Click to [phone]  Additional Information:appt

## 2024-02-28 ENCOUNTER — LAB VISIT (OUTPATIENT)
Dept: LAB | Facility: HOSPITAL | Age: 79
End: 2024-02-28
Payer: MEDICARE

## 2024-02-28 ENCOUNTER — OFFICE VISIT (OUTPATIENT)
Dept: ENDOCRINOLOGY | Facility: CLINIC | Age: 79
End: 2024-02-28
Payer: MEDICARE

## 2024-02-28 VITALS
BODY MASS INDEX: 21.44 KG/M2 | HEART RATE: 53 BPM | DIASTOLIC BLOOD PRESSURE: 63 MMHG | SYSTOLIC BLOOD PRESSURE: 118 MMHG | WEIGHT: 133.38 LBS | HEIGHT: 66 IN | OXYGEN SATURATION: 99 %

## 2024-02-28 DIAGNOSIS — E55.9 VITAMIN D DEFICIENCY: Primary | ICD-10-CM

## 2024-02-28 DIAGNOSIS — M85.80 OSTEOPENIA, UNSPECIFIED LOCATION: ICD-10-CM

## 2024-02-28 DIAGNOSIS — E55.9 VITAMIN D DEFICIENCY: ICD-10-CM

## 2024-02-28 DIAGNOSIS — E06.3 HASHIMOTO'S THYROIDITIS: ICD-10-CM

## 2024-02-28 LAB
25(OH)D3+25(OH)D2 SERPL-MCNC: 68 NG/ML (ref 30–96)
BASOPHILS # BLD AUTO: 0.06 K/UL (ref 0–0.2)
BASOPHILS NFR BLD: 0.8 % (ref 0–1.9)
DIFFERENTIAL METHOD BLD: NORMAL
EOSINOPHIL # BLD AUTO: 0.1 K/UL (ref 0–0.5)
EOSINOPHIL NFR BLD: 1.5 % (ref 0–8)
ERYTHROCYTE [DISTWIDTH] IN BLOOD BY AUTOMATED COUNT: 14 % (ref 11.5–14.5)
HCT VFR BLD AUTO: 42.3 % (ref 37–48.5)
HGB BLD-MCNC: 13.9 G/DL (ref 12–16)
IMM GRANULOCYTES # BLD AUTO: 0.04 K/UL (ref 0–0.04)
IMM GRANULOCYTES NFR BLD AUTO: 0.5 % (ref 0–0.5)
LYMPHOCYTES # BLD AUTO: 1.8 K/UL (ref 1–4.8)
LYMPHOCYTES NFR BLD: 22.4 % (ref 18–48)
MCH RBC QN AUTO: 29.9 PG (ref 27–31)
MCHC RBC AUTO-ENTMCNC: 32.9 G/DL (ref 32–36)
MCV RBC AUTO: 91 FL (ref 82–98)
MONOCYTES # BLD AUTO: 0.7 K/UL (ref 0.3–1)
MONOCYTES NFR BLD: 8.6 % (ref 4–15)
NEUTROPHILS # BLD AUTO: 5.2 K/UL (ref 1.8–7.7)
NEUTROPHILS NFR BLD: 66.2 % (ref 38–73)
NRBC BLD-RTO: 0 /100 WBC
PLATELET # BLD AUTO: 224 K/UL (ref 150–450)
PMV BLD AUTO: 11.2 FL (ref 9.2–12.9)
RBC # BLD AUTO: 4.65 M/UL (ref 4–5.4)
TSH SERPL DL<=0.005 MIU/L-ACNC: 1.88 UIU/ML (ref 0.4–4)
WBC # BLD AUTO: 7.87 K/UL (ref 3.9–12.7)

## 2024-02-28 PROCEDURE — 85025 COMPLETE CBC W/AUTO DIFF WBC: CPT | Performed by: STUDENT IN AN ORGANIZED HEALTH CARE EDUCATION/TRAINING PROGRAM

## 2024-02-28 PROCEDURE — 1126F AMNT PAIN NOTED NONE PRSNT: CPT | Mod: CPTII,S$GLB,, | Performed by: INTERNAL MEDICINE

## 2024-02-28 PROCEDURE — 3074F SYST BP LT 130 MM HG: CPT | Mod: CPTII,S$GLB,, | Performed by: INTERNAL MEDICINE

## 2024-02-28 PROCEDURE — 1159F MED LIST DOCD IN RCRD: CPT | Mod: CPTII,S$GLB,, | Performed by: INTERNAL MEDICINE

## 2024-02-28 PROCEDURE — G2211 COMPLEX E/M VISIT ADD ON: HCPCS | Mod: S$GLB,,, | Performed by: INTERNAL MEDICINE

## 2024-02-28 PROCEDURE — 99214 OFFICE O/P EST MOD 30 MIN: CPT | Mod: GC,S$GLB,, | Performed by: INTERNAL MEDICINE

## 2024-02-28 PROCEDURE — 99999 PR PBB SHADOW E&M-EST. PATIENT-LVL III: CPT | Mod: PBBFAC,,,

## 2024-02-28 PROCEDURE — 82306 VITAMIN D 25 HYDROXY: CPT | Performed by: STUDENT IN AN ORGANIZED HEALTH CARE EDUCATION/TRAINING PROGRAM

## 2024-02-28 PROCEDURE — 3078F DIAST BP <80 MM HG: CPT | Mod: CPTII,S$GLB,, | Performed by: INTERNAL MEDICINE

## 2024-02-28 PROCEDURE — 84443 ASSAY THYROID STIM HORMONE: CPT | Performed by: STUDENT IN AN ORGANIZED HEALTH CARE EDUCATION/TRAINING PROGRAM

## 2024-02-28 PROCEDURE — 1157F ADVNC CARE PLAN IN RCRD: CPT | Mod: CPTII,S$GLB,, | Performed by: INTERNAL MEDICINE

## 2024-02-28 PROCEDURE — 36415 COLL VENOUS BLD VENIPUNCTURE: CPT | Performed by: STUDENT IN AN ORGANIZED HEALTH CARE EDUCATION/TRAINING PROGRAM

## 2024-02-28 PROCEDURE — 1101F PT FALLS ASSESS-DOCD LE1/YR: CPT | Mod: CPTII,S$GLB,, | Performed by: INTERNAL MEDICINE

## 2024-02-28 PROCEDURE — 3288F FALL RISK ASSESSMENT DOCD: CPT | Mod: CPTII,S$GLB,, | Performed by: INTERNAL MEDICINE

## 2024-02-28 NOTE — PROGRESS NOTES
"ENDOCRINOLOGY FOLLOW UP VISIT: 02/28/2024    Subjective:      Patient ID: Verna Hines is a 79 y.o. female.    Chief Complaint: Hypothyroidism      History of Present Illness  Verna Hines is a 79 y.o. female who is here for follow-up evaluation for Hashimoto's thyroiditis. Her last visit with Dr. Rome was on 10/12/21.     With regards to Hashimoto's thyroiditis:    She is currently taking brand-name Synthroid 50 mcg daily.     Takes it first thing in the AM on empty stomach but does take it with other meds     She has two daughters with Hashimoto's as well. Sammy has Down syndrome and is followed by me for her thyroid as well.     Some weight loss, about 25 lb - intentional. Dietary changes    She sleeps 4-5 hours a night       With regards to osteopenia and vitamin D deficiency:    Currently on Vitamin D3 - 2000 IU  Adequate dietary Calcium intake     No fragility fractures  Not on treatment.  She refuses repeat DEXA - she had back problems after having to cross her legs for the scan, so she doesn't want it done again.    Works as a  40 hours/week     Reviewed past medical, family, social history and updated as appropriate.       ROS:   As above    Objective:     /63   Pulse (!) 53   Ht 5' 6" (1.676 m)   Wt 60.5 kg (133 lb 6.1 oz)   SpO2 99%   BMI 21.53 kg/m²   BP Readings from Last 3 Encounters:   02/28/24 118/63   10/12/21 110/72   07/08/20 118/72     Wt Readings from Last 1 Encounters:   02/28/24 0915 60.5 kg (133 lb 6.1 oz)     Body mass index is 21.53 kg/m².      Physical Exam  Vitals reviewed.   Constitutional:       General: She is not in acute distress.     Appearance: Normal appearance. She is not toxic-appearing.   Eyes:      Extraocular Movements: Extraocular movements intact.      Conjunctiva/sclera: Conjunctivae normal.      Pupils: Pupils are equal, round, and reactive to light.   Neck:      Thyroid: No thyroid mass, thyromegaly or thyroid tenderness. "   Pulmonary:      Effort: Pulmonary effort is normal. No respiratory distress.   Skin:     General: Skin is warm and dry.   Neurological:      General: No focal deficit present.      Mental Status: She is alert and oriented to person, place, and time.   Psychiatric:         Mood and Affect: Mood normal.         Thought Content: Thought content normal.            Lab Review:    Lab Results   Component Value Date    TSH 4.10 06/29/2021   No recent labs available     Assessment and Plan     Vitamin D deficiency  Continue Vitamin D3 - 2000 IU daily.  Check Vit D yearly.    Hashimoto's thyroiditis  TSH had previously been well controlled on Synthroid 50 mcg  Continue this dose for now, we will titrate according to TSH results  Yearly TSH monitoring due    Osteopenia  Refused repeat DEXA.    Continue vitamin D and adequate dietary calcium.    Encouraged weight-bearing exercises.    Avoid falls.    RTC 1 year    Irasema Scott MD  [facility], [address]th [address]  [address] [place], [place], [address]    Office: [phone]  Fax: [phone]

## 2024-02-28 NOTE — ASSESSMENT & PLAN NOTE
TSH had previously been well controlled on Synthroid 50 mcg  Continue this dose for now, we will titrate according to TSH results  Yearly TSH monitoring due

## 2024-02-28 NOTE — PROGRESS NOTES
I have seen the patient, reviewed the fellow's history and physical, assessment, plan, and progress note. I have personally interviewed and examined the patient at bedside and agree with the findings.     She is doing well.  Her  was recently admitted to a memory unit which is leading to stress.  Check TFTs today.     Jake Rome MD  Endocrinology Staff

## 2024-02-28 NOTE — ASSESSMENT & PLAN NOTE
Refused repeat DEXA.    Continue vitamin D and adequate dietary calcium.    Encouraged weight-bearing exercises.    Avoid falls.

## 2024-03-18 DIAGNOSIS — E06.3 HASHIMOTO'S THYROIDITIS: ICD-10-CM

## 2024-03-18 RX ORDER — LEVOTHYROXINE SODIUM 50 UG/1
50 TABLET ORAL DAILY
Qty: 90 TABLET | Refills: 4 | Status: SHIPPED | OUTPATIENT
Start: 2024-03-18

## 2024-06-16 ENCOUNTER — OFFICE VISIT (OUTPATIENT)
Dept: URGENT CARE | Facility: CLINIC | Age: 79
End: 2024-06-16
Payer: MEDICARE

## 2024-06-16 VITALS
HEART RATE: 52 BPM | WEIGHT: 120 LBS | TEMPERATURE: 98 F | DIASTOLIC BLOOD PRESSURE: 63 MMHG | SYSTOLIC BLOOD PRESSURE: 119 MMHG | BODY MASS INDEX: 19.29 KG/M2 | HEIGHT: 66 IN

## 2024-06-16 DIAGNOSIS — S90.859A: Primary | ICD-10-CM

## 2024-06-16 PROCEDURE — 28190 REMOVAL OF FOOT FOREIGN BODY: CPT | Mod: LT,S$GLB,, | Performed by: NURSE PRACTITIONER

## 2024-06-16 PROCEDURE — 99203 OFFICE O/P NEW LOW 30 MIN: CPT | Mod: 25,S$GLB,, | Performed by: NURSE PRACTITIONER

## 2024-06-16 RX ORDER — CIPROFLOXACIN HYDROCHLORIDE 3 MG/ML
SOLUTION/ DROPS OPHTHALMIC
COMMUNITY
Start: 2024-06-12

## 2024-06-16 NOTE — PROCEDURES
Foreign body removal    Date/Time: 2024 10:15 AM    Performed by: Alan Bishop NP  Authorized by: Alan Bishop NP  Consent Done: Yes  Consent: Verbal consent obtained.  Risks and benefits: risks, benefits and alternatives were discussed  Consent given by: patient  Patient understanding: patient states understanding of the procedure being performed  Patient consent: the patient's understanding of the procedure matches consent given  Procedure consent: procedure consent matches procedure scheduled  Relevant documents: relevant documents present and verified  Test results: test results available and properly labeled  Site marked: the operative site was marked  Imaging studies: imaging studies not available  Required items: required blood products, implants, devices, and special equipment available  Patient identity confirmed:  and name  Body area: skin  General location: lower extremity  Location details: left foot  Anesthesia method: none.    Patient sedated: no  Patient restrained: no  Patient cooperative: yes  Localization method: visualized  Removal mechanism: forceps and irrigation  Dressing: antibiotic ointment and dressing applied  Tendon involvement: none  Depth: subcutaneous  Complexity: simple  1 objects recovered.  Objects recovered: wooden splinter, possibly plant matter  Post-procedure assessment: foreign body removed  Patient tolerance: Patient tolerated the procedure well with no immediate complications

## 2024-06-16 NOTE — PROGRESS NOTES
"Subjective:      Patient ID: Verna Hines is a 79 y.o. female.    Vitals:  height is 5' 6" (1.676 m) and weight is 54.4 kg (120 lb). Her oral temperature is 97.6 °F (36.4 °C). Her blood pressure is 119/63 and her pulse is 52 (abnormal).     Chief Complaint: Foreign Body (Left foot)    78yo female pt reports that she was working in the garden yesterday and stepped on something and felt a piece of plant matter (splinter or alivia) go through her show into her foot.  Reports that she was unable to get it out herself and that it was more painful this morning, particularly with walking.  Denies swelling or redness, denies bleeding or drainage.    Foreign Body  The incident occurred 12 to 24 hours ago. Suspected object: splinter. Intake: left foot heeel.       Musculoskeletal:  Positive for pain and trauma. Negative for joint pain, joint swelling, abnormal ROM of joint, muscle cramps and muscle ache.   Skin:  Positive for lesion. Negative for wound, puncture wound and erythema.   Neurological:  Negative for numbness and tingling.      Objective:     Physical Exam   Constitutional: She is oriented to person, place, and time. She appears well-developed.   HENT:   Head: Normocephalic and atraumatic. Head is without abrasion, without contusion and without laceration.   Ears:   Right Ear: External ear normal.   Left Ear: External ear normal.   Nose: Nose normal.   Mouth/Throat: Oropharynx is clear and moist and mucous membranes are normal.   Eyes: Conjunctivae, EOM and lids are normal. Pupils are equal, round, and reactive to light.   Neck: Trachea normal and phonation normal. Neck supple.   Cardiovascular: Normal rate, regular rhythm and normal heart sounds.   Pulmonary/Chest: Effort normal and breath sounds normal. No stridor. No respiratory distress.   Musculoskeletal: Normal range of motion.         General: Normal range of motion.        Feet:    Neurological: She is alert and oriented to person, place, and time. "   Skin: Skin is warm, dry, intact and no rash. Capillary refill takes less than 2 seconds. No abrasion, No burn, No bruising, No erythema and No ecchymosis   Psychiatric: Her speech is normal and behavior is normal. Judgment and thought content normal.   Nursing note and vitals reviewed.      Assessment:     1. Splinter of foot without infection, initial encounter        Plan:     Foreign body removal    Date/Time: 2024 10:15 AM    Performed by: Alan Bishop NP  Authorized by: Alan Bishop NP  Consent Done: Yes  Consent: Verbal consent obtained.  Risks and benefits: risks, benefits and alternatives were discussed  Consent given by: patient  Patient understanding: patient states understanding of the procedure being performed  Patient consent: the patient's understanding of the procedure matches consent given  Procedure consent: procedure consent matches procedure scheduled  Relevant documents: relevant documents present and verified  Test results: test results available and properly labeled  Site marked: the operative site was marked  Imaging studies: imaging studies not available  Required items: required blood products, implants, devices, and special equipment available  Patient identity confirmed:  and name  Body area: skin  General location: lower extremity  Location details: left foot  Anesthesia method: none.    Patient sedated: no  Patient restrained: no  Patient cooperative: yes  Localization method: visualized  Removal mechanism: forceps and irrigation  Dressing: antibiotic ointment and dressing applied  Tendon involvement: none  Depth: subcutaneous  Complexity: simple  1 objects recovered.  Objects recovered: wooden splinter, possibly plant matter  Post-procedure assessment: foreign body removed  Patient tolerance: Patient tolerated the procedure well with no immediate complications      Instructed pt to cleanse area with antibacterial soap and cover with antibiotic ointment and adhesive bandage  until closed.  Provided education on return/ER precautions.  Pt verbalized understanding and agreed to plan.        Splinter of foot without infection, initial encounter  -     Foreign body removal      Patient Instructions   If your condition worsens or fails to improve, we recommend that you receive another evaluation at the ER immediately, contact your PCP to discuss your concerns, or return here.  You must understand that you've received an urgent care treatment only, and that you may be released before all your medical problems are known or treated.  You, the patient, will arrange for follow-up care as instructed.     Avoid picking or manipulating the wound.  Clean the wound twice a day and put antibiotic ointment on it.  You should seek ER treatment if develop fever, increasing pain, swelling, red streaking, or other signs of increasing infection.     If you are female and on birth control pills, use additional methods to prevent pregnancy while on antibiotics and for one cycle after.  If you were given narcotics, do not drive or operate heavy equipment or machinery while taking these medications.     Tylenol or Ibuprofen can also be used as directed for pain unless you have an allergy to them or medical condition (such as stomach ulcers, kidney or liver disease, or use blood thinners, etc.) for which you should not be taking these type of medications.

## 2024-07-08 ENCOUNTER — OFFICE VISIT (OUTPATIENT)
Dept: URGENT CARE | Facility: CLINIC | Age: 79
End: 2024-07-08
Payer: MEDICARE

## 2024-07-08 VITALS
OXYGEN SATURATION: 99 % | HEART RATE: 60 BPM | HEIGHT: 66 IN | SYSTOLIC BLOOD PRESSURE: 137 MMHG | RESPIRATION RATE: 18 BRPM | WEIGHT: 120 LBS | TEMPERATURE: 98 F | BODY MASS INDEX: 19.29 KG/M2 | DIASTOLIC BLOOD PRESSURE: 77 MMHG

## 2024-07-08 VITALS
SYSTOLIC BLOOD PRESSURE: 129 MMHG | HEART RATE: 56 BPM | RESPIRATION RATE: 20 BRPM | OXYGEN SATURATION: 100 % | DIASTOLIC BLOOD PRESSURE: 80 MMHG | HEIGHT: 66 IN | TEMPERATURE: 98 F | WEIGHT: 120 LBS | BODY MASS INDEX: 19.29 KG/M2

## 2024-07-08 DIAGNOSIS — S90.852A FOREIGN BODY IN LEFT FOOT, INITIAL ENCOUNTER: Primary | ICD-10-CM

## 2024-07-08 DIAGNOSIS — M79.5 FOREIGN BODY (FB) IN SOFT TISSUE: Primary | ICD-10-CM

## 2024-07-08 PROCEDURE — 99499 UNLISTED E&M SERVICE: CPT | Mod: S$GLB,,, | Performed by: NURSE PRACTITIONER

## 2024-07-08 PROCEDURE — 73630 X-RAY EXAM OF FOOT: CPT | Mod: LT,S$GLB,, | Performed by: RADIOLOGY

## 2024-07-08 PROCEDURE — 28190 REMOVAL OF FOOT FOREIGN BODY: CPT | Mod: LT,S$GLB,, | Performed by: NURSE PRACTITIONER

## 2024-07-08 NOTE — PROCEDURES
Foreign body removal    Date/Time: 7/8/2024 4:45 PM    Performed by: Frances Farr NP  Authorized by: Frances Farr NP  Consent Done: Emergent Situation  Body area: skin  General location: lower extremity  Location details: left foot    Patient sedated: no  Patient restrained: no  Patient cooperative: no  Localization method: magnification  Removal mechanism: hemostat  Dressing: dressing applied  Tendon involvement: none  Depth: subcutaneous  Complexity: simple  1 objects recovered.  Objects recovered: splinter  Post-procedure assessment: foreign body removed  Patient tolerance: Patient tolerated the procedure well with no immediate complications

## 2024-07-08 NOTE — PROGRESS NOTES
"Subjective:      Patient ID: Verna Hines is a 79 y.o. female.    Vitals:  height is 5' 6" (1.676 m) and weight is 54.4 kg (120 lb). Her oral temperature is 98.1 °F (36.7 °C). Her blood pressure is 129/80 and her pulse is 56 (abnormal). Her respiration is 20 and oxygen saturation is 100%.     Chief Complaint: Foreign Body in Skin    This is a 79 y.o. female who presents today with a chief complaint of left foot pain. Pt states something went in her foot and she can feel it.    Foreign Body  The incident occurred 1 to 3 hours ago. The foreign body is Unknown. The incident was suspected. The incident was witnessed/reported by The patient.     ROS   Objective:     Physical Exam    Assessment:     No diagnosis found.    Plan:       There are no diagnoses linked to this encounter.                "

## 2024-07-08 NOTE — PROGRESS NOTES
"Subjective:      Patient ID: Verna Hines is a 79 y.o. female.    Vitals:  height is 5' 6" (1.676 m) and weight is 54.4 kg (120 lb). Her oral temperature is 97.9 °F (36.6 °C). Her blood pressure is 137/77 and her pulse is 60. Her respiration is 18 and oxygen saturation is 99%.     Chief Complaint: Foot Injury    This is a 79 y.o. female who presents today with a chief complaint of left foot pain. Pt states something went in her foot and she can feel it.    Foot Injury   The incident occurred 1 to 3 hours ago. The incident occurred at home. The pain is present in the left foot. Pertinent negatives include no inability to bear weight, loss of motion, loss of sensation, muscle weakness, numbness or tingling. She has tried nothing for the symptoms.       Skin:  Negative for erythema.   Neurological:  Negative for numbness.      Objective:     Physical Exam   Constitutional: She is oriented to person, place, and time. No distress. normal  HENT:   Head: Normocephalic and atraumatic.   Ears:   Right Ear: External ear normal.   Left Ear: External ear normal.   Nose: No rhinorrhea or congestion.   Eyes: Conjunctivae are normal. Pupils are equal, round, and reactive to light. Extraocular movement intact   Pulmonary/Chest: Effort normal. No stridor. No respiratory distress.   Abdominal: Normal appearance.   Musculoskeletal:         General: Tenderness present.      Right foot: Normal. Normal range of motion. No tenderness or swelling.      Left foot: Normal range of motion. Tenderness present. No swelling. Comments: Very small blister in arch of foot  ? FB.        Feet:    Neurological: no focal deficit. She is alert, oriented to person, place, and time and at baseline.   Skin: Skin is warm and dry. Capillary refill takes less than 2 seconds. No erythema   Psychiatric: Her behavior is normal. Mood, judgment and thought content normal.   Nursing note and vitals reviewed.    Assessment:     Plan:   1. Foreign body (FB) " in soft tissue  - X-Ray Foot Complete 3 view Left; Future   Reviewed xray no FB seen.  Frances Farr called they will do addendum to my note.

## 2025-04-11 DIAGNOSIS — E06.3 HASHIMOTO'S THYROIDITIS: ICD-10-CM

## 2025-04-11 RX ORDER — LEVOTHYROXINE SODIUM 50 UG/1
50 TABLET ORAL DAILY
Qty: 90 TABLET | Refills: 1 | Status: SHIPPED | OUTPATIENT
Start: 2025-04-11

## 2025-07-07 DIAGNOSIS — E06.3 HASHIMOTO'S THYROIDITIS: ICD-10-CM

## 2025-07-07 RX ORDER — LEVOTHYROXINE SODIUM 50 UG/1
50 TABLET ORAL DAILY
Qty: 90 TABLET | Refills: 1 | Status: SHIPPED | OUTPATIENT
Start: 2025-07-07

## 2025-08-06 ENCOUNTER — TELEPHONE (OUTPATIENT)
Dept: ENDOCRINOLOGY | Facility: CLINIC | Age: 80
End: 2025-08-06

## 2025-08-06 ENCOUNTER — OFFICE VISIT (OUTPATIENT)
Dept: ENDOCRINOLOGY | Facility: CLINIC | Age: 80
End: 2025-08-06
Payer: MEDICARE

## 2025-08-06 ENCOUNTER — LAB VISIT (OUTPATIENT)
Dept: LAB | Facility: HOSPITAL | Age: 80
End: 2025-08-06
Attending: INTERNAL MEDICINE
Payer: MEDICARE

## 2025-08-06 VITALS
SYSTOLIC BLOOD PRESSURE: 106 MMHG | BODY MASS INDEX: 21.72 KG/M2 | DIASTOLIC BLOOD PRESSURE: 64 MMHG | HEART RATE: 52 BPM | WEIGHT: 134.56 LBS

## 2025-08-06 DIAGNOSIS — E06.3 HASHIMOTO'S THYROIDITIS: Primary | ICD-10-CM

## 2025-08-06 DIAGNOSIS — E06.3 HASHIMOTO'S THYROIDITIS: ICD-10-CM

## 2025-08-06 DIAGNOSIS — M85.80 OSTEOPENIA, UNSPECIFIED LOCATION: ICD-10-CM

## 2025-08-06 DIAGNOSIS — E55.9 VITAMIN D DEFICIENCY: ICD-10-CM

## 2025-08-06 LAB
25(OH)D3+25(OH)D2 SERPL-MCNC: 61 NG/ML (ref 30–96)
ABSOLUTE EOSINOPHIL (OHS): 0.18 K/UL
ABSOLUTE MONOCYTE (OHS): 0.75 K/UL (ref 0.3–1)
ABSOLUTE NEUTROPHIL COUNT (OHS): 4.52 K/UL (ref 1.8–7.7)
BASOPHILS # BLD AUTO: 0.05 K/UL
BASOPHILS NFR BLD AUTO: 0.7 %
ERYTHROCYTE [DISTWIDTH] IN BLOOD BY AUTOMATED COUNT: 13.7 % (ref 11.5–14.5)
HCT VFR BLD AUTO: 41.8 % (ref 37–48.5)
HGB BLD-MCNC: 13.7 GM/DL (ref 12–16)
IMM GRANULOCYTES # BLD AUTO: 0.02 K/UL (ref 0–0.04)
IMM GRANULOCYTES NFR BLD AUTO: 0.3 % (ref 0–0.5)
LYMPHOCYTES # BLD AUTO: 1.83 K/UL (ref 1–4.8)
MCH RBC QN AUTO: 30.2 PG (ref 27–31)
MCHC RBC AUTO-ENTMCNC: 32.8 G/DL (ref 32–36)
MCV RBC AUTO: 92 FL (ref 82–98)
NUCLEATED RBC (/100WBC) (OHS): 0 /100 WBC
PLATELET # BLD AUTO: 215 K/UL (ref 150–450)
PMV BLD AUTO: 10.6 FL (ref 9.2–12.9)
RBC # BLD AUTO: 4.54 M/UL (ref 4–5.4)
RELATIVE EOSINOPHIL (OHS): 2.4 %
RELATIVE LYMPHOCYTE (OHS): 24.9 % (ref 18–48)
RELATIVE MONOCYTE (OHS): 10.2 % (ref 4–15)
RELATIVE NEUTROPHIL (OHS): 61.5 % (ref 38–73)
TSH SERPL-ACNC: 2.22 UIU/ML (ref 0.4–4)
WBC # BLD AUTO: 7.35 K/UL (ref 3.9–12.7)

## 2025-08-06 PROCEDURE — 82306 VITAMIN D 25 HYDROXY: CPT

## 2025-08-06 PROCEDURE — 36415 COLL VENOUS BLD VENIPUNCTURE: CPT

## 2025-08-06 PROCEDURE — 3078F DIAST BP <80 MM HG: CPT | Mod: CPTII,S$GLB,, | Performed by: INTERNAL MEDICINE

## 2025-08-06 PROCEDURE — 1159F MED LIST DOCD IN RCRD: CPT | Mod: CPTII,S$GLB,, | Performed by: INTERNAL MEDICINE

## 2025-08-06 PROCEDURE — 1157F ADVNC CARE PLAN IN RCRD: CPT | Mod: CPTII,S$GLB,, | Performed by: INTERNAL MEDICINE

## 2025-08-06 PROCEDURE — 99213 OFFICE O/P EST LOW 20 MIN: CPT | Mod: S$GLB,,, | Performed by: INTERNAL MEDICINE

## 2025-08-06 PROCEDURE — G2211 COMPLEX E/M VISIT ADD ON: HCPCS | Mod: S$GLB,,, | Performed by: INTERNAL MEDICINE

## 2025-08-06 PROCEDURE — 84443 ASSAY THYROID STIM HORMONE: CPT

## 2025-08-06 PROCEDURE — 85025 COMPLETE CBC W/AUTO DIFF WBC: CPT

## 2025-08-06 PROCEDURE — 3074F SYST BP LT 130 MM HG: CPT | Mod: CPTII,S$GLB,, | Performed by: INTERNAL MEDICINE

## 2025-08-06 PROCEDURE — 99999 PR PBB SHADOW E&M-EST. PATIENT-LVL III: CPT | Mod: PBBFAC,,, | Performed by: INTERNAL MEDICINE

## 2025-08-06 PROCEDURE — 1125F AMNT PAIN NOTED PAIN PRSNT: CPT | Mod: CPTII,S$GLB,, | Performed by: INTERNAL MEDICINE

## 2025-08-06 NOTE — PROGRESS NOTES
ENDOCRINOLOGY FOLLOW UP VISIT: 08/06/2025    Subjective:      Patient ID: Verna Hines is a 80 y.o. female.    Chief Complaint: Hypothyroidism      History of Present Illness  Verna Hines is a 79 y.o. female who is here for follow-up evaluation for Hashimoto's thyroiditis.     The patient's last visit with me was on 2/28/2024.      With regards to Hashimoto's thyroiditis:    She is currently taking brand-name Synthroid 50 mcg daily.     Takes it first thing in the AM on empty stomach but does take it with other meds     She has two daughters with Hashimoto's as well. Sammy has Down syndrome and is followed by me for her thyroid as well.        Lab Results   Component Value Date    TSH 1.879 02/28/2024    TSH 4.10 06/29/2021    TSH 3.67 10/02/2018    FREET4 1.27 03/14/2013    THYROIDAB 454 (H) 10/02/2018    T4FREE 1.1 06/29/2021    T4FREE 1.1 10/02/2018           With regards to osteopenia and vitamin D deficiency:    Currently on Vitamin D3 - 2000 IU  Adequate dietary Calcium intake     No fragility fractures  Not on treatment.  She refuses repeat DXA - she had back problems after having to cross her legs for the scan, so she doesn't want it done again.    Works as a  - working from home lately     Reviewed past medical, family, social history and updated as appropriate.       ROS:   As above    Objective:     /64   Pulse (!) 52   Wt 61 kg (134 lb 9.5 oz)   BMI 21.72 kg/m²   BP Readings from Last 3 Encounters:   08/06/25 106/64   07/08/24 129/80   07/08/24 137/77     Wt Readings from Last 1 Encounters:   08/06/25 0848 61 kg (134 lb 9.5 oz)     Body mass index is 21.72 kg/m².      Physical Exam  Vitals reviewed.   Constitutional:       General: She is not in acute distress.     Appearance: Normal appearance. She is not toxic-appearing.   Eyes:      Extraocular Movements: Extraocular movements intact.      Conjunctiva/sclera: Conjunctivae normal.      Pupils: Pupils are equal, round,  and reactive to light.   Neck:      Thyroid: No thyroid mass, thyromegaly or thyroid tenderness.   Pulmonary:      Effort: Pulmonary effort is normal. No respiratory distress.   Skin:     General: Skin is warm and dry.   Neurological:      General: No focal deficit present.      Mental Status: She is alert and oriented to person, place, and time.   Psychiatric:         Mood and Affect: Mood normal.         Thought Content: Thought content normal.            Lab Review:    Lab Results   Component Value Date    TSH 1.879 02/28/2024   No recent labs available     Assessment and Plan     Hashimoto's thyroiditis  Takes brand name Synthroid 50 mcg. Did not feel well on generic.    Check TSH yearly.      Osteopenia  Refused repeat DXA.    Continue vitamin D and adequate dietary calcium.    Encouraged weight-bearing exercises.    Avoid falls.    Vitamin D deficiency  Continue Vitamin D3 - 2000 IU daily.  Check Vit D yearly.      RTC 1 year

## 2025-08-06 NOTE — TELEPHONE ENCOUNTER
Please call the patient to let them know both her and Sammy's results look great. No changes are needed to either Synthroid or Vitamin D dosing.

## 2025-08-06 NOTE — ASSESSMENT & PLAN NOTE
Refused repeat DXA.    Continue vitamin D and adequate dietary calcium.    Encouraged weight-bearing exercises.    Avoid falls.